# Patient Record
Sex: MALE | Race: WHITE | NOT HISPANIC OR LATINO
[De-identification: names, ages, dates, MRNs, and addresses within clinical notes are randomized per-mention and may not be internally consistent; named-entity substitution may affect disease eponyms.]

---

## 2021-06-23 ENCOUNTER — NON-APPOINTMENT (OUTPATIENT)
Age: 51
End: 2021-06-23

## 2021-06-23 ENCOUNTER — APPOINTMENT (OUTPATIENT)
Dept: CARDIOLOGY | Facility: CLINIC | Age: 51
End: 2021-06-23
Payer: COMMERCIAL

## 2021-06-23 VITALS
SYSTOLIC BLOOD PRESSURE: 139 MMHG | WEIGHT: 250 LBS | BODY MASS INDEX: 29.52 KG/M2 | RESPIRATION RATE: 18 BRPM | DIASTOLIC BLOOD PRESSURE: 98 MMHG | HEIGHT: 77 IN | TEMPERATURE: 98.4 F | HEART RATE: 77 BPM

## 2021-06-23 DIAGNOSIS — Z00.00 ENCOUNTER FOR GENERAL ADULT MEDICAL EXAMINATION W/OUT ABNORMAL FINDINGS: ICD-10-CM

## 2021-06-23 DIAGNOSIS — F17.200 NICOTINE DEPENDENCE, UNSPECIFIED, UNCOMPLICATED: ICD-10-CM

## 2021-06-23 DIAGNOSIS — Z78.9 OTHER SPECIFIED HEALTH STATUS: ICD-10-CM

## 2021-06-23 PROCEDURE — 93000 ELECTROCARDIOGRAM COMPLETE: CPT

## 2021-06-23 PROCEDURE — 99072 ADDL SUPL MATRL&STAF TM PHE: CPT

## 2021-06-23 PROCEDURE — 99244 OFF/OP CNSLTJ NEW/EST MOD 40: CPT

## 2021-06-23 RX ORDER — DESLORATADINE 5 MG/1
5 TABLET ORAL
Refills: 0 | Status: ACTIVE | COMMUNITY

## 2021-08-09 ENCOUNTER — APPOINTMENT (OUTPATIENT)
Dept: CARDIOLOGY | Facility: CLINIC | Age: 51
End: 2021-08-09
Payer: COMMERCIAL

## 2021-08-09 PROCEDURE — 93015 CV STRESS TEST SUPVJ I&R: CPT

## 2021-08-09 PROCEDURE — 99072 ADDL SUPL MATRL&STAF TM PHE: CPT

## 2021-08-09 PROCEDURE — 93306 TTE W/DOPPLER COMPLETE: CPT

## 2021-09-09 ENCOUNTER — APPOINTMENT (OUTPATIENT)
Dept: CARDIOLOGY | Facility: CLINIC | Age: 51
End: 2021-09-09
Payer: COMMERCIAL

## 2021-09-09 ENCOUNTER — NON-APPOINTMENT (OUTPATIENT)
Age: 51
End: 2021-09-09

## 2021-09-09 VITALS
BODY MASS INDEX: 29.52 KG/M2 | HEART RATE: 76 BPM | SYSTOLIC BLOOD PRESSURE: 150 MMHG | HEIGHT: 77 IN | RESPIRATION RATE: 18 BRPM | WEIGHT: 250 LBS | DIASTOLIC BLOOD PRESSURE: 90 MMHG

## 2021-09-09 DIAGNOSIS — Z82.49 FAMILY HISTORY OF ISCHEMIC HEART DISEASE AND OTHER DISEASES OF THE CIRCULATORY SYSTEM: ICD-10-CM

## 2021-09-09 PROCEDURE — 99072 ADDL SUPL MATRL&STAF TM PHE: CPT

## 2021-09-09 PROCEDURE — 93000 ELECTROCARDIOGRAM COMPLETE: CPT

## 2021-09-09 PROCEDURE — 99214 OFFICE O/P EST MOD 30 MIN: CPT

## 2021-09-09 NOTE — ASSESSMENT
[FreeTextEntry1] : EKG shows normal sinus rhythm at a rate of 74 with poor R-wave progression V1 to V3 and nonspecific T-wave flattening;\par \par In summary this 50-year-old black male has a history of recent uncontrolled hypertension but stable cardiac pattern and stable cardiac testing with negative stress test\par \par Plan:\par \par Patient recommended to increase lisinopril to 40 mg p.o. daily;\par \par Continue home blood pressure monitoring on periodic basis\par \par Continue low sodium, low carbohydrate reducing diet\par \par Return to office within 3 months or p.r.n.\par \par Followup with primary care for timely checkups and laboratory blood tests;;;;

## 2021-09-09 NOTE — HISTORY OF PRESENT ILLNESS
[FreeTextEntry1] : Graded cardiovascular exercise stress test from 8/9/21 showed below average exercise tolerance into stage III (shortness of breath from mask patient reports)-no symptoms of chest pain, no arrhythmias seen occasional PVCs exaggerated blood pressure response peaking at 220/88 mmHg; 89% of predicted max heart rate of 150;\par EKG remained negative for ST abnormality or ischemia;\par \par Transthoracic echo from August 9, 2021 shows preserved cardiac chamber sizes with normal systolic function of the left ventricle and normal ejection fraction in the range of 55%. There was no significant valvulopathy seen trace / or  trivial pericardial effusion;\par \par

## 2021-09-09 NOTE — REASON FOR VISIT
[Arrhythmia/ECG Abnorrmalities] : arrhythmia/ECG abnormalities [Hypertension] : hypertension [Other: ____] : [unfilled] [FreeTextEntry3] : H R / K H [FreeTextEntry1] : The patient is a 50-year-old  male with history for uncontrolled hypertension and abnormal EKG with some atypical chest pain syndrome;\par \par He presents back to the office today to discuss results of recent cardiac testing and to reassess his blood pressure;\par \par Overall, patient states that he has been feeling fairly well and sticking to a diet and taking FiberCon which is also told to lose weight and become less bloated;\par \par He denies any recent chest pain, shortness of breath, palpitations or dizziness;

## 2021-09-09 NOTE — PHYSICAL EXAM
[Well Developed] : well developed [Well Nourished] : well nourished [No Acute Distress] : no acute distress [Normal Conjunctiva] : normal conjunctiva [Normal Venous Pressure] : normal venous pressure [No Carotid Bruit] : no carotid bruit [Normal S1, S2] : normal S1, S2 [No Rub] : no rub [No Murmur] : no murmur [No Gallop] : no gallop [Clear Lung Fields] : clear lung fields [Good Air Entry] : good air entry [No Respiratory Distress] : no respiratory distress  [Soft] : abdomen soft [Non Tender] : non-tender [No Masses/organomegaly] : no masses/organomegaly [Normal Bowel Sounds] : normal bowel sounds [Normal Gait] : normal gait [No Edema] : no edema [No Cyanosis] : no cyanosis [No Clubbing] : no clubbing [No Varicosities] : no varicosities [No Rash] : no rash [No Skin Lesions] : no skin lesions [Moves all extremities] : moves all extremities [No Focal Deficits] : no focal deficits [Normal Speech] : normal speech [Alert and Oriented] : alert and oriented [Normal memory] : normal memory [de-identified] : tall  [de-identified] : obese

## 2021-12-27 ENCOUNTER — NON-APPOINTMENT (OUTPATIENT)
Age: 51
End: 2021-12-27

## 2021-12-27 ENCOUNTER — APPOINTMENT (OUTPATIENT)
Dept: CARDIOLOGY | Facility: CLINIC | Age: 51
End: 2021-12-27
Payer: COMMERCIAL

## 2021-12-27 VITALS
RESPIRATION RATE: 16 BRPM | DIASTOLIC BLOOD PRESSURE: 98 MMHG | BODY MASS INDEX: 29.52 KG/M2 | WEIGHT: 250 LBS | HEART RATE: 64 BPM | HEIGHT: 77 IN | SYSTOLIC BLOOD PRESSURE: 123 MMHG

## 2021-12-27 PROCEDURE — 99214 OFFICE O/P EST MOD 30 MIN: CPT

## 2021-12-27 PROCEDURE — 93000 ELECTROCARDIOGRAM COMPLETE: CPT

## 2021-12-27 PROCEDURE — 99072 ADDL SUPL MATRL&STAF TM PHE: CPT

## 2021-12-27 RX ORDER — AZELASTINE HYDROCHLORIDE 137 UG/1
0.1 SPRAY, METERED NASAL
Refills: 0 | Status: DISCONTINUED | COMMUNITY
End: 2021-12-27

## 2021-12-27 NOTE — HISTORY OF PRESENT ILLNESS
[FreeTextEntry1] : He reports that the increasing the lisinopril to 40 mg daily he actually did feel better and had less of a headache;\par \par Today, blood pressure appears elevated at 123/98;\par \par Regular cardiac stress test from August 2021 showed good exercise tolerance. No arrhythmias seen. EKG remained negative for any significant ST abnormalities or ischemia;\par \par

## 2021-12-27 NOTE — ASSESSMENT
[FreeTextEntry1] : EKG demonstrated normal sinus rhythm at a rate of 64 with some nonspecific T wave flattening noted of questionable significance-unchanged;\par \par In summary, this 51-year-old  male with history for chronic hypertension and abnormal EKG, ; demonstrates diastolic hypertension again in the office today despite recent increase in his medication\par \par Plan:\par \par Counseled patient again on the fourth of pursuing a low sodium, low carbohydrate reducing diet;\par \par Patient recommended empirically to add amlodipine 5 mg p.o. daily to her current medical regimen;\par \par continue periodic home blood pressure monitoring;\par \par Followup to our office within 2-3 months for blood pressure check and assessment;\par \par

## 2021-12-27 NOTE — PHYSICAL EXAM

## 2021-12-27 NOTE — REASON FOR VISIT
[Arrhythmia/ECG Abnorrmalities] : arrhythmia/ECG abnormalities [Hypertension] : hypertension [FreeTextEntry3] : JONATHAN Cole [FreeTextEntry1] : The patient is a 51-year-old white male with a history for chronic hypertension and abnormal EKG who presents back to the office today for general cardiac checkup;\par \par The patient reports that he has been asymptomatic for chest pain, shortness of breath, palpitations or dizziness;\par \par However, he states his blood pressure has been less than ideally controlled when he has it checked himself;

## 2022-02-07 ENCOUNTER — NON-APPOINTMENT (OUTPATIENT)
Age: 52
End: 2022-02-07

## 2022-02-07 ENCOUNTER — APPOINTMENT (OUTPATIENT)
Dept: CARDIOLOGY | Facility: CLINIC | Age: 52
End: 2022-02-07
Payer: COMMERCIAL

## 2022-02-07 VITALS
DIASTOLIC BLOOD PRESSURE: 80 MMHG | RESPIRATION RATE: 16 BRPM | BODY MASS INDEX: 29.52 KG/M2 | WEIGHT: 250 LBS | SYSTOLIC BLOOD PRESSURE: 120 MMHG | HEIGHT: 77 IN | HEART RATE: 66 BPM

## 2022-02-07 PROCEDURE — 93000 ELECTROCARDIOGRAM COMPLETE: CPT

## 2022-02-07 PROCEDURE — 99072 ADDL SUPL MATRL&STAF TM PHE: CPT

## 2022-02-07 PROCEDURE — 99213 OFFICE O/P EST LOW 20 MIN: CPT

## 2022-02-16 NOTE — ASSESSMENT
[FreeTextEntry1] : EKG demonstrated normal sinus rhythm at a rate of 66 with some nonspecific T wave flattening noted of questionable significance-unchanged;\par \par In summary, this 51-year-old male with history for chronic hypertension and abnormal EKG, ; had demonstrated diastolic hypertension in the office last December, was added Amlodipine 5 mg QD with today's blood pressure well controlled;

## 2022-02-16 NOTE — PHYSICAL EXAM
[Well Developed] : well developed [No Acute Distress] : no acute distress [Normal Conjunctiva] : normal conjunctiva [Normal Venous Pressure] : normal venous pressure [No Carotid Bruit] : no carotid bruit [Normal S1, S2] : normal S1, S2 [No Murmur] : no murmur [No Rub] : no rub [No Gallop] : no gallop [Clear Lung Fields] : clear lung fields [Good Air Entry] : good air entry [No Respiratory Distress] : no respiratory distress  [Soft] : abdomen soft [Non Tender] : non-tender [No Masses/organomegaly] : no masses/organomegaly [Normal Gait] : normal gait [No Edema] : no edema [No Cyanosis] : no cyanosis [No Clubbing] : no clubbing [No Rash] : no rash [No Skin Lesions] : no skin lesions [Moves all extremities] : moves all extremities [No Focal Deficits] : no focal deficits [Normal Speech] : normal speech [Alert and Oriented] : alert and oriented [Normal memory] : normal memory [de-identified] : overweight

## 2022-02-16 NOTE — DISCUSSION/SUMMARY
[FreeTextEntry1] : 1).  Patient's blood pressure now well controlled on current regimen.  Continue with Lisinopril 40 mg QD and Amlodipine 5 mg QD.\par \par He was instructed to monitor BP at home 2-3 x week, approximately 2-3 hours after taking A.M. meds and bring log to f/u.  Will purchase new BP cuff (OMRON series 3).\par \par 2).  Diet and lifestyle modification discussed including low sodium, low fat and low carbohydrate weight reducing diet.  Patient is to implement aerobic exercise regimen few days per week. \par \par 3).  Follow up with PCP regarding routine checkups and blood work.  Forward all testing/lab work to our office.\par \par 4).  Recommend patient report any untoward symptoms. \par \par 5).  No additional cardiac testing indicated at this time. \par \par 6).  Follow up with Dr. Lopez in 5-6 months or PRN.

## 2022-02-16 NOTE — REASON FOR VISIT
[FreeTextEntry1] : NATHALIE ROUSE is being seen for arrhythmia/ECG abnormalities and hypertension. \par \par The patient is a 51-year-old white male with a history for chronic hypertension and abnormal EKG who presents back to the office today for general cardiac checkup;\par \par The patient reports that he has been asymptomatic for chest pain, shortness of breath, palpitations or dizziness;\par \par His blood pressure had been less than ideally controlled and we had started him on Amlodipine 5 mg in addition to his usual regimen of Lisinopril 40 mg QD;

## 2022-02-16 NOTE — HISTORY OF PRESENT ILLNESS
[FreeTextEntry1] : Blood pressure today well controlled at 120/80;\par \par Regular cardiac stress test from August 2021 showed good exercise tolerance. No arrhythmias seen. EKG remained negative for any significant ST abnormalities or ischemia;

## 2022-08-12 ENCOUNTER — APPOINTMENT (OUTPATIENT)
Dept: CARDIOLOGY | Facility: CLINIC | Age: 52
End: 2022-08-12

## 2022-08-12 VITALS
WEIGHT: 257 LBS | SYSTOLIC BLOOD PRESSURE: 114 MMHG | RESPIRATION RATE: 16 BRPM | DIASTOLIC BLOOD PRESSURE: 76 MMHG | HEIGHT: 77 IN | BODY MASS INDEX: 30.34 KG/M2

## 2022-08-12 PROCEDURE — 93000 ELECTROCARDIOGRAM COMPLETE: CPT

## 2022-08-12 PROCEDURE — 99024 POSTOP FOLLOW-UP VISIT: CPT

## 2022-08-12 NOTE — ASSESSMENT
[FreeTextEntry1] : EKG demonstrated normal sinus rhythm at a rate of 71; with PRWP V1-3; no acute changes;\par \par \par In summary, this 51-year-old male with history for chronic hypertension and abnormal EKG, ; had demonstrated diastolic hypertension in the office last December, was added Amlodipine 5 mg QD with blood pressure still appears well controlled; otherwise, stable cardiac pattern;\par \par

## 2022-08-12 NOTE — PHYSICAL EXAM

## 2022-08-12 NOTE — DISCUSSION/SUMMARY
[FreeTextEntry1] : 1).  Patient's blood pressure now well controlled on current regimen.  Continue with Lisinopril 40 mg QD and Amlodipine 5 mg QD.\par \par \par \par 2).  Diet and lifestyle modification discussed including low sodium, low fat and low carbohydrate weight reducing diet.  Patient is to implement aerobic exercise regimen few days per week. \par \par 3).  Follow up with PCP regarding routine checkups and blood work.  Regular checkups and laboratory blood tests recommended;\par \par 4).  Recommend patient report any untoward symptoms. \par \par 5).  No additional cardiac testing indicated at this time.  Follow-up within 5 months or as needed;\par \par 6).  Follow up with Dr. Lopez in 5-6 months or PRN.

## 2022-08-12 NOTE — REASON FOR VISIT
[FreeTextEntry1] : NATHALIE ROUSE is being seen for arrhythmia/ECG abnormalities and hypertension. \par \par The patient is a 51-year-old white male working as a , with a history for chronic hypertension and abnormal EKG who presents back to the office today for general cardiac checkup;\par \par The patient reports that he has been asymptomatic for chest pain, shortness of breath, palpitations or dizziness;\par Since adjustment of his blood pressure medication he has been under much better control with his blood pressure lately;

## 2023-01-17 ENCOUNTER — NON-APPOINTMENT (OUTPATIENT)
Age: 53
End: 2023-01-17

## 2023-01-17 ENCOUNTER — APPOINTMENT (OUTPATIENT)
Dept: CARDIOLOGY | Facility: CLINIC | Age: 53
End: 2023-01-17
Payer: COMMERCIAL

## 2023-01-17 VITALS
HEIGHT: 77 IN | SYSTOLIC BLOOD PRESSURE: 110 MMHG | HEART RATE: 69 BPM | WEIGHT: 246 LBS | BODY MASS INDEX: 29.05 KG/M2 | RESPIRATION RATE: 16 BRPM | DIASTOLIC BLOOD PRESSURE: 70 MMHG

## 2023-01-17 PROCEDURE — 99072 ADDL SUPL MATRL&STAF TM PHE: CPT

## 2023-01-17 PROCEDURE — 99214 OFFICE O/P EST MOD 30 MIN: CPT

## 2023-01-17 PROCEDURE — 93000 ELECTROCARDIOGRAM COMPLETE: CPT

## 2023-01-17 NOTE — DISCUSSION/SUMMARY
[FreeTextEntry1] : 1).  Patient's blood pressure now well controlled on current regimen.  Continue with Lisinopril 40 mg QD and Amlodipine 5 mg QD.\par \par \par 2).  Encouraged to continue diet and lifestyle modification discussed including low sodium, low fat and low carbohydrate weight reducing diet.  Patient is to implement aerobic exercise regimen few days per week. \par \par 3).  Follow up with PCP regarding routine checkups and blood work.  Regular checkups and laboratory blood tests recommended;\par \par 4).  Recommend patient report any untoward symptoms. \par \par 5).  No additional cardiac testing indicated at this time.  Follow-up within 5 months or as needed;\par \par 6).  Follow up within 5 to 6 months or as needed;

## 2023-01-17 NOTE — PHYSICAL EXAM

## 2023-01-17 NOTE — REASON FOR VISIT
[FreeTextEntry3] : JONATHAN Cole [FreeTextEntry1] : NATHALIE ROUSE is being seen for arrhythmia/ECG abnormalities and hypertension. \par \par The patient is a 52-year-old white male working as a , with a history for chronic hypertension and abnormal EKG who presents back to the office today for general cardiac checkup;\par \par The patient reports that he has been asymptomatic for chest pain, shortness of breath, palpitations or dizziness;\par Since adjustment of his blood pressure medication he has been under much better control with his blood pressure lately;

## 2023-01-17 NOTE — HISTORY OF PRESENT ILLNESS
[FreeTextEntry1] : Blood pressure today well controlled at 110/70;\par \par Regular cardiac stress test from August 2021 showed good exercise tolerance. No arrhythmias seen. EKG remained negative for any significant ST abnormalities or ischemia;

## 2023-01-17 NOTE — ASSESSMENT
[FreeTextEntry1] : EKG demonstrated normal sinus rhythm at a rate of 69; with PRWP V1-3; no acute changes;\par \par \par In summary, this 52-year-old male with history for chronic hypertension and abnormal EKG, ; had demonstrated diastolic hypertension in the office last December,   added Amlodipine 5 mg QD with blood pressure still appears well controlled; otherwise, stable cardiac pattern;\par Otherwise, stable cardiac pattern;\par

## 2023-07-06 ENCOUNTER — OFFICE (OUTPATIENT)
Dept: URBAN - METROPOLITAN AREA CLINIC 104 | Facility: CLINIC | Age: 53
Setting detail: OPHTHALMOLOGY
End: 2023-07-06
Payer: COMMERCIAL

## 2023-07-06 DIAGNOSIS — B00.52: ICD-10-CM

## 2023-07-06 PROCEDURE — 92002 INTRM OPH EXAM NEW PATIENT: CPT | Performed by: OPTOMETRIST

## 2023-07-06 ASSESSMENT — TONOMETRY
OS_IOP_MMHG: 17
OD_IOP_MMHG: 17

## 2023-07-06 ASSESSMENT — REFRACTION_AUTOREFRACTION
OS_SPHERE: +1.25
OS_CYLINDER: -0.25
OD_CYLINDER: -0.50
OD_AXIS: 145
OD_SPHERE: +1.00
OS_AXIS: 078

## 2023-07-06 ASSESSMENT — REFRACTION_CURRENTRX
OD_CYLINDER: -0.25
OS_ADD: +1.75
OD_OVR_VA: 20/
OD_VPRISM_DIRECTION: PROGS
OD_AXIS: 149
OS_SPHERE: +1.25
OD_ADD: +1.75
OD_SPHERE: +1.25
OS_CYLINDER: -0.25
OS_OVR_VA: 20/
OS_AXIS: 029
OS_VPRISM_DIRECTION: PROGS

## 2023-07-06 ASSESSMENT — VISUAL ACUITY
OD_BCVA: 20/20
OS_BCVA: 20/20

## 2023-07-06 ASSESSMENT — KERATOMETRY
OS_K1POWER_DIOPTERS: 43.72
OD_AXISANGLE_DEGREES: 088
OS_K2POWER_DIOPTERS: 44.47
OS_AXISANGLE_DEGREES: 090
OD_K1POWER_DIOPTERS: 43.72
OD_K2POWER_DIOPTERS: 44.23

## 2023-07-06 ASSESSMENT — SPHEQUIV_DERIVED
OD_SPHEQUIV: 0.75
OS_SPHEQUIV: 1.125

## 2023-07-06 ASSESSMENT — CONFRONTATIONAL VISUAL FIELD TEST (CVF)
OS_FINDINGS: FULL
OD_FINDINGS: FULL

## 2023-07-06 ASSESSMENT — AXIALLENGTH_DERIVED
OS_AL: 22.9528
OD_AL: 23.1343

## 2023-07-13 ENCOUNTER — OFFICE (OUTPATIENT)
Dept: URBAN - METROPOLITAN AREA CLINIC 104 | Facility: CLINIC | Age: 53
Setting detail: OPHTHALMOLOGY
End: 2023-07-13
Payer: COMMERCIAL

## 2023-07-13 DIAGNOSIS — H16.222: ICD-10-CM

## 2023-07-13 PROBLEM — B00.52 HERPES SIMPLEX DENDRITIC KERATITIS: Status: RESOLVED | Noted: 2023-07-06 | Resolved: 2023-07-13

## 2023-07-13 PROCEDURE — 99213 OFFICE O/P EST LOW 20 MIN: CPT | Performed by: OPTOMETRIST

## 2023-07-13 ASSESSMENT — SUPERFICIAL PUNCTATE KERATITIS (SPK): OS_SPK: T

## 2023-07-13 ASSESSMENT — REFRACTION_CURRENTRX
OS_SPHERE: +1.25
OS_VPRISM_DIRECTION: PROGS
OD_AXIS: 149
OD_VPRISM_DIRECTION: PROGS
OD_CYLINDER: -0.25
OD_SPHERE: +1.25
OS_OVR_VA: 20/
OS_CYLINDER: -0.25
OS_AXIS: 029
OD_ADD: +1.75
OD_OVR_VA: 20/
OS_ADD: +1.75

## 2023-07-13 ASSESSMENT — REFRACTION_AUTOREFRACTION
OD_AXIS: 145
OS_CYLINDER: -0.25
OS_SPHERE: +1.25
OD_SPHERE: +1.00
OD_CYLINDER: -0.50
OS_AXIS: 078

## 2023-07-13 ASSESSMENT — KERATOMETRY
OS_AXISANGLE_DEGREES: 090
OS_K2POWER_DIOPTERS: 44.47
OD_AXISANGLE_DEGREES: 088
OS_K1POWER_DIOPTERS: 43.72
OD_K1POWER_DIOPTERS: 43.72
OD_K2POWER_DIOPTERS: 44.23

## 2023-07-13 ASSESSMENT — SPHEQUIV_DERIVED
OD_SPHEQUIV: 0.75
OS_SPHEQUIV: 1.125

## 2023-07-13 ASSESSMENT — AXIALLENGTH_DERIVED
OD_AL: 23.1343
OS_AL: 22.9528

## 2023-07-13 ASSESSMENT — VISUAL ACUITY
OS_BCVA: 20/20
OD_BCVA: 20/20

## 2023-07-17 ENCOUNTER — APPOINTMENT (OUTPATIENT)
Dept: CARDIOLOGY | Facility: CLINIC | Age: 53
End: 2023-07-17
Payer: COMMERCIAL

## 2023-07-17 VITALS
WEIGHT: 245 LBS | SYSTOLIC BLOOD PRESSURE: 110 MMHG | DIASTOLIC BLOOD PRESSURE: 68 MMHG | HEIGHT: 77 IN | HEART RATE: 59 BPM | RESPIRATION RATE: 16 BRPM | BODY MASS INDEX: 28.93 KG/M2

## 2023-07-17 PROCEDURE — 99214 OFFICE O/P EST MOD 30 MIN: CPT

## 2023-07-17 PROCEDURE — 93000 ELECTROCARDIOGRAM COMPLETE: CPT

## 2023-07-17 RX ORDER — LISINOPRIL 40 MG/1
40 TABLET ORAL DAILY
Qty: 90 | Refills: 3 | Status: ACTIVE | COMMUNITY
Start: 1900-01-01 | End: 1900-01-01

## 2023-07-17 RX ORDER — AMLODIPINE BESYLATE 5 MG/1
5 TABLET ORAL DAILY
Qty: 90 | Refills: 3 | Status: ACTIVE | COMMUNITY
Start: 2021-12-27 | End: 1900-01-01

## 2023-07-17 NOTE — DISCUSSION/SUMMARY
[FreeTextEntry1] : 1).  Patient's blood pressure now well controlled on current regimen.  Continue with Lisinopril 40 mg QD and Amlodipine 5 mg QD.\par \par \par 2).  Encouraged to continue diet and lifestyle modification discussed including low sodium, low fat and low carbohydrate weight reducing diet.  Patient is to implement aerobic exercise regimen few days per week. \par \par 3).  Follow up with PCP regarding routine checkups and blood work.  Regular checkups and laboratory blood tests recommended;\par \par 4).  Recommend patient report any untoward symptoms. \par \par 5).  We will schedule transthoracic echocardiogram to assess cardiac function and valvulopathy prior to next visit within 5 to 6 months;

## 2023-07-17 NOTE — ASSESSMENT
[FreeTextEntry1] : EKG demonstrated normal sinus rhythm at a rate of 59; with; no acute changes;\par \par \par In summary, this 52-year-old male with history for chronic hypertension and abnormal EKG, ; had demonstrated diastolic hypertension in the office last December,   added Amlodipine 5 mg QD with blood pressure still appears well controlled; otherwise, stable cardiac pattern;\par \par \par Plan:\par

## 2023-07-17 NOTE — PHYSICAL EXAM

## 2023-07-17 NOTE — HISTORY OF PRESENT ILLNESS
[FreeTextEntry1] : Blood pressure today well controlled at 110/68;\par \par Regular cardiac stress test from August 2021 showed good exercise tolerance. No arrhythmias seen. EKG remained negative for any significant ST abnormalities or ischemia;

## 2023-11-02 ENCOUNTER — RX ONLY (RX ONLY)
Age: 53
End: 2023-11-02

## 2023-11-02 ENCOUNTER — OFFICE (OUTPATIENT)
Dept: URBAN - METROPOLITAN AREA CLINIC 104 | Facility: CLINIC | Age: 53
Setting detail: OPHTHALMOLOGY
End: 2023-11-02
Payer: COMMERCIAL

## 2023-11-02 DIAGNOSIS — B00.52: ICD-10-CM

## 2023-11-02 PROCEDURE — 99213 OFFICE O/P EST LOW 20 MIN: CPT | Performed by: OPTOMETRIST

## 2023-11-02 ASSESSMENT — REFRACTION_AUTOREFRACTION
OS_SPHERE: UNABLE
OD_SPHERE: +0.75
OD_AXIS: 136
OD_CYLINDER: -1.25

## 2023-11-02 ASSESSMENT — REFRACTION_CURRENTRX
OS_ADD: +2.25
OS_SPHERE: +1.25
OD_OVR_VA: 20/
OS_CYLINDER: -0.25
OD_CYLINDER: -0.25
OD_ADD: +2.25
OD_AXIS: 152
OS_AXIS: 030
OS_OVR_VA: 20/
OD_SPHERE: +1.25

## 2023-11-02 ASSESSMENT — CONFRONTATIONAL VISUAL FIELD TEST (CVF)
OD_FINDINGS: FULL
OS_FINDINGS: FULL

## 2023-11-02 ASSESSMENT — SPHEQUIV_DERIVED: OD_SPHEQUIV: 0.125

## 2023-11-09 ENCOUNTER — OFFICE (OUTPATIENT)
Dept: URBAN - METROPOLITAN AREA CLINIC 104 | Facility: CLINIC | Age: 53
Setting detail: OPHTHALMOLOGY
End: 2023-11-09
Payer: COMMERCIAL

## 2023-11-09 ENCOUNTER — RX ONLY (RX ONLY)
Age: 53
End: 2023-11-09

## 2023-11-09 DIAGNOSIS — B00.52: ICD-10-CM

## 2023-11-09 PROCEDURE — 99213 OFFICE O/P EST LOW 20 MIN: CPT | Performed by: OPTOMETRIST

## 2023-11-09 ASSESSMENT — CONFRONTATIONAL VISUAL FIELD TEST (CVF)
OS_FINDINGS: FULL
OD_FINDINGS: FULL

## 2023-11-13 ENCOUNTER — OFFICE (OUTPATIENT)
Dept: URBAN - METROPOLITAN AREA CLINIC 104 | Facility: CLINIC | Age: 53
Setting detail: OPHTHALMOLOGY
End: 2023-11-13
Payer: COMMERCIAL

## 2023-11-13 DIAGNOSIS — B00.52: ICD-10-CM

## 2023-11-13 PROCEDURE — 99213 OFFICE O/P EST LOW 20 MIN: CPT | Performed by: OPTOMETRIST

## 2023-11-13 ASSESSMENT — CONFRONTATIONAL VISUAL FIELD TEST (CVF)
OS_FINDINGS: FULL
OD_FINDINGS: FULL

## 2023-11-22 ENCOUNTER — OFFICE (OUTPATIENT)
Dept: URBAN - METROPOLITAN AREA CLINIC 104 | Facility: CLINIC | Age: 53
Setting detail: OPHTHALMOLOGY
End: 2023-11-22
Payer: COMMERCIAL

## 2023-11-22 ENCOUNTER — RX ONLY (RX ONLY)
Age: 53
End: 2023-11-22

## 2023-11-22 DIAGNOSIS — B00.52: ICD-10-CM

## 2023-11-22 PROCEDURE — 99213 OFFICE O/P EST LOW 20 MIN: CPT | Performed by: OPTOMETRIST

## 2023-11-22 ASSESSMENT — CONFRONTATIONAL VISUAL FIELD TEST (CVF)
OS_FINDINGS: FULL
OD_FINDINGS: FULL

## 2023-11-27 ENCOUNTER — APPOINTMENT (OUTPATIENT)
Dept: CARDIOLOGY | Facility: CLINIC | Age: 53
End: 2023-11-27
Payer: COMMERCIAL

## 2023-11-27 PROCEDURE — 93306 TTE W/DOPPLER COMPLETE: CPT

## 2023-12-19 ENCOUNTER — APPOINTMENT (OUTPATIENT)
Dept: CARDIOLOGY | Facility: CLINIC | Age: 53
End: 2023-12-19
Payer: COMMERCIAL

## 2023-12-19 ENCOUNTER — NON-APPOINTMENT (OUTPATIENT)
Age: 53
End: 2023-12-19

## 2023-12-19 VITALS
HEART RATE: 58 BPM | DIASTOLIC BLOOD PRESSURE: 78 MMHG | RESPIRATION RATE: 16 BRPM | WEIGHT: 250 LBS | SYSTOLIC BLOOD PRESSURE: 125 MMHG | BODY MASS INDEX: 29.52 KG/M2 | OXYGEN SATURATION: 99 % | HEIGHT: 77 IN

## 2023-12-19 PROCEDURE — 99214 OFFICE O/P EST MOD 30 MIN: CPT

## 2023-12-19 PROCEDURE — 93000 ELECTROCARDIOGRAM COMPLETE: CPT

## 2023-12-19 NOTE — REASON FOR VISIT
[FreeTextEntry3] : JONATHAN Cole [FreeTextEntry1] : NATHALIE ROUSE is being seen for arrhythmia/ECG abnormalities and hypertension.   The patient is a 53-year-old white male working as a , with a history for chronic hypertension and abnormal EKG, with borderline enlargement of the ascending aorta previously reported on echo who presents back to the office today for general cardiac checkup, and to discuss results of most recent echocardiogram;  The patient reports that he has been asymptomatic for chest pain, shortness of breath, palpitations or dizziness; Since adjustment of his blood pressure medication he has been under much better control with his blood pressure lately;

## 2023-12-19 NOTE — ASSESSMENT
[FreeTextEntry1] : EKG demonstrating sinus bradycardia at a rate of 58; late R wave transition V2 to V3 and some general low voltage bipolar leads.  Otherwise no acute changes;  In summary, this 53-year-old gentleman has a history for hypertension which appears well-controlled on current medical regimen. Recent transthoracic echocardiogram shows preserved left ventricular systolic function with borderline LVH.  There was upper range of normal ascending aortic dimensions at 3.6 but actually "normal" for body mass etc.; Patient otherwise appears hemodynamically stable;  Plan:  Patient reassured;  No additional cardiac workup indicated at this time;  Continue current medical regimen;  Follow-up to our office within 6 months or as needed;  Continue regular checkups and laboratory blood test with primary care encouraged;

## 2023-12-19 NOTE — HISTORY OF PRESENT ILLNESS
[FreeTextEntry1] : Blood pressure today well controlled;  Transthoracic echocardiogram from November 27, 2023 demonstrates preserved left ventricular systolic function in the range of 55%.  There was some mild LVH noted.  Mild fibrocalcific sclerosis of the aortic valve without stenosis.  Trace tricuspid insufficiency; ascending aorta again measures at 3.6 cm -however with new guidelines and proportions, appears "normal" for patient's size and body mass;   Regular cardiac stress test from August 2021 showed good exercise tolerance. No arrhythmias seen. EKG remained negative for any significant ST abnormalities or ischemia;

## 2023-12-19 NOTE — PHYSICAL EXAM

## 2024-01-17 ENCOUNTER — OFFICE (OUTPATIENT)
Dept: URBAN - METROPOLITAN AREA CLINIC 104 | Facility: CLINIC | Age: 54
Setting detail: OPHTHALMOLOGY
End: 2024-01-17
Payer: COMMERCIAL

## 2024-01-17 DIAGNOSIS — H16.222: ICD-10-CM

## 2024-01-17 DIAGNOSIS — B00.52: ICD-10-CM

## 2024-01-17 DIAGNOSIS — H43.812: ICD-10-CM

## 2024-01-17 PROCEDURE — 92014 COMPRE OPH EXAM EST PT 1/>: CPT | Performed by: OPTOMETRIST

## 2024-01-17 ASSESSMENT — CONFRONTATIONAL VISUAL FIELD TEST (CVF)
OS_FINDINGS: FULL
OD_FINDINGS: FULL

## 2024-06-17 ENCOUNTER — NON-APPOINTMENT (OUTPATIENT)
Age: 54
End: 2024-06-17

## 2024-06-17 ENCOUNTER — APPOINTMENT (OUTPATIENT)
Dept: CARDIOLOGY | Facility: CLINIC | Age: 54
End: 2024-06-17
Payer: COMMERCIAL

## 2024-06-17 VITALS
HEART RATE: 80 BPM | HEIGHT: 77 IN | BODY MASS INDEX: 28.93 KG/M2 | RESPIRATION RATE: 16 BRPM | DIASTOLIC BLOOD PRESSURE: 78 MMHG | SYSTOLIC BLOOD PRESSURE: 108 MMHG | WEIGHT: 245 LBS

## 2024-06-17 DIAGNOSIS — Z86.79 PERSONAL HISTORY OF OTHER DISEASES OF THE CIRCULATORY SYSTEM: ICD-10-CM

## 2024-06-17 DIAGNOSIS — I10 ESSENTIAL (PRIMARY) HYPERTENSION: ICD-10-CM

## 2024-06-17 DIAGNOSIS — E78.00 PURE HYPERCHOLESTEROLEMIA, UNSPECIFIED: ICD-10-CM

## 2024-06-17 DIAGNOSIS — R94.31 ABNORMAL ELECTROCARDIOGRAM [ECG] [EKG]: ICD-10-CM

## 2024-06-17 PROCEDURE — G2211 COMPLEX E/M VISIT ADD ON: CPT

## 2024-06-17 PROCEDURE — 99214 OFFICE O/P EST MOD 30 MIN: CPT

## 2024-06-17 PROCEDURE — 93000 ELECTROCARDIOGRAM COMPLETE: CPT

## 2024-06-17 NOTE — PHYSICAL EXAM

## 2024-06-17 NOTE — REASON FOR VISIT
[FreeTextEntry3] : JONATHAN Cole [FreeTextEntry1] : NATHALIE ROUSE is being seen for arrhythmia/ECG abnormalities and hypertension.   The patient is a 53-year-old white male working as a , with a history for chronic hypertension and abnormal EKG, with borderline enlargement of the ascending aorta previously reported on echo who presents back to the office today for general cardiac checkup,   The patient reports that he has been asymptomatic for chest pain, shortness of breath, palpitations; however, over the past couple of days patient did have some episodes of a slight "dizziness" which seem to be more like an imbalance like feeling briefly and then improved;  He states he may have not been drinking enough fluids and may have been partially dehydrated.

## 2024-06-17 NOTE — ASSESSMENT
[FreeTextEntry1] : EKG demonstrating sinus bradycardia at a rate of 80; no acute changes;  In summary, this 53-year-old gentleman has a history for hypertension which appears well-controlled on current medical regimen. Recent transthoracic echocardiogram shows preserved left ventricular systolic function with borderline LVH.  There was upper range of normal ascending aortic dimensions at 3.6 but actually "normal" for body mass etc.; Patient otherwise appears hemodynamically stable;  Some slight transient dizziness described as possible imbalance but could be secondary to blood pressure medication and some dehydration and now seems to be better;  Plan:  Patient reassured;  No additional cardiac workup indicated at this time;  Continue current medical regimen; maintain adequate p.o. hydration especially in the warm summer months;  Follow-up to our office within 6 months or as needed;  Continue regular checkups and laboratory blood test with primary care encouraged;

## 2024-12-16 ENCOUNTER — APPOINTMENT (OUTPATIENT)
Dept: CARDIOLOGY | Facility: CLINIC | Age: 54
End: 2024-12-16

## 2025-01-22 ENCOUNTER — OFFICE (OUTPATIENT)
Dept: URBAN - METROPOLITAN AREA CLINIC 104 | Facility: CLINIC | Age: 55
Setting detail: OPHTHALMOLOGY
End: 2025-01-22
Payer: COMMERCIAL

## 2025-01-22 DIAGNOSIS — H43.812: ICD-10-CM

## 2025-01-22 DIAGNOSIS — H16.222: ICD-10-CM

## 2025-01-22 PROCEDURE — 92014 COMPRE OPH EXAM EST PT 1/>: CPT | Performed by: OPTOMETRIST

## 2025-01-22 ASSESSMENT — REFRACTION_AUTOREFRACTION
OS_AXIS: 075
OD_SPHERE: +1.25
OD_AXIS: 101
OS_CYLINDER: -0.75
OS_SPHERE: +1.75
OD_CYLINDER: -0.25

## 2025-01-22 ASSESSMENT — KERATOMETRY
OD_K2POWER_DIOPTERS: 43.50
OD_AXISANGLE_DEGREES: 090
OS_K1POWER_DIOPTERS: 43.50
OD_K1POWER_DIOPTERS: 43.50
OS_K2POWER_DIOPTERS: 43.75
OS_AXISANGLE_DEGREES: 176

## 2025-01-22 ASSESSMENT — TONOMETRY
OD_IOP_MMHG: 18
OS_IOP_MMHG: 18

## 2025-01-22 ASSESSMENT — CONFRONTATIONAL VISUAL FIELD TEST (CVF)
OS_FINDINGS: FULL
OD_FINDINGS: FULL

## 2025-01-22 ASSESSMENT — REFRACTION_CURRENTRX
OD_ADD: +1.75
OS_CYLINDER: -0.25
OD_SPHERE: +1.50
OD_AXIS: 142
OD_CYLINDER: -0.25
OS_OVR_VA: 20/
OS_AXIS: 037
OS_ADD: +1.75
OD_OVR_VA: 20/
OS_SPHERE: +1.50

## 2025-01-22 ASSESSMENT — VISUAL ACUITY
OS_BCVA: 20/20
OD_BCVA: 20/20

## 2025-02-05 ENCOUNTER — OFFICE (OUTPATIENT)
Dept: URBAN - METROPOLITAN AREA CLINIC 104 | Facility: CLINIC | Age: 55
Setting detail: OPHTHALMOLOGY
End: 2025-02-05
Payer: COMMERCIAL

## 2025-02-05 DIAGNOSIS — H16.222: ICD-10-CM

## 2025-02-05 PROCEDURE — 99213 OFFICE O/P EST LOW 20 MIN: CPT | Performed by: OPTOMETRIST

## 2025-02-05 ASSESSMENT — REFRACTION_CURRENTRX
OD_OVR_VA: 20/
OS_OVR_VA: 20/

## 2025-02-05 ASSESSMENT — TONOMETRY
OD_IOP_MMHG: 17
OS_IOP_MMHG: 18

## 2025-02-05 ASSESSMENT — CONFRONTATIONAL VISUAL FIELD TEST (CVF)
OS_FINDINGS: FULL
OD_FINDINGS: FULL

## 2025-02-05 ASSESSMENT — VISUAL ACUITY
OD_BCVA: 20/20
OS_BCVA: 20/20

## 2025-02-06 ENCOUNTER — APPOINTMENT (OUTPATIENT)
Dept: CARDIOLOGY | Facility: CLINIC | Age: 55
End: 2025-02-06
Payer: COMMERCIAL

## 2025-02-06 ENCOUNTER — NON-APPOINTMENT (OUTPATIENT)
Age: 55
End: 2025-02-06

## 2025-02-06 VITALS
RESPIRATION RATE: 16 BRPM | HEIGHT: 77 IN | SYSTOLIC BLOOD PRESSURE: 110 MMHG | WEIGHT: 247 LBS | BODY MASS INDEX: 29.16 KG/M2 | DIASTOLIC BLOOD PRESSURE: 64 MMHG | HEART RATE: 85 BPM

## 2025-02-06 DIAGNOSIS — I10 ESSENTIAL (PRIMARY) HYPERTENSION: ICD-10-CM

## 2025-02-06 DIAGNOSIS — R94.31 ABNORMAL ELECTROCARDIOGRAM [ECG] [EKG]: ICD-10-CM

## 2025-02-06 DIAGNOSIS — E78.00 PURE HYPERCHOLESTEROLEMIA, UNSPECIFIED: ICD-10-CM

## 2025-02-06 PROCEDURE — 93000 ELECTROCARDIOGRAM COMPLETE: CPT

## 2025-02-06 PROCEDURE — 99214 OFFICE O/P EST MOD 30 MIN: CPT

## 2025-02-13 ENCOUNTER — OFFICE (OUTPATIENT)
Dept: URBAN - METROPOLITAN AREA CLINIC 104 | Facility: CLINIC | Age: 55
Setting detail: OPHTHALMOLOGY
End: 2025-02-13
Payer: COMMERCIAL

## 2025-02-13 DIAGNOSIS — B00.52: ICD-10-CM

## 2025-02-13 PROCEDURE — 92012 INTRM OPH EXAM EST PATIENT: CPT | Performed by: OPTOMETRIST

## 2025-02-13 ASSESSMENT — CONFRONTATIONAL VISUAL FIELD TEST (CVF)
OS_FINDINGS: FULL
OD_FINDINGS: FULL

## 2025-02-13 ASSESSMENT — TONOMETRY: OS_IOP_MMHG: 18

## 2025-02-13 ASSESSMENT — VISUAL ACUITY
OD_BCVA: 20/20
OS_BCVA: 20/20

## 2025-04-14 ENCOUNTER — OFFICE (OUTPATIENT)
Dept: URBAN - METROPOLITAN AREA CLINIC 104 | Facility: CLINIC | Age: 55
Setting detail: OPHTHALMOLOGY
End: 2025-04-14
Payer: COMMERCIAL

## 2025-04-14 DIAGNOSIS — H16.222: ICD-10-CM

## 2025-04-14 PROCEDURE — 92012 INTRM OPH EXAM EST PATIENT: CPT | Performed by: OPTOMETRIST

## 2025-04-14 ASSESSMENT — CONFRONTATIONAL VISUAL FIELD TEST (CVF)
OS_FINDINGS: FULL
OD_FINDINGS: FULL

## 2025-04-14 ASSESSMENT — VISUAL ACUITY
OS_BCVA: 20/20
OD_BCVA: 20/20

## 2025-04-14 ASSESSMENT — TONOMETRY: OS_IOP_MMHG: 19

## 2025-04-18 ENCOUNTER — OFFICE (OUTPATIENT)
Dept: URBAN - METROPOLITAN AREA CLINIC 104 | Facility: CLINIC | Age: 55
Setting detail: OPHTHALMOLOGY
End: 2025-04-18
Payer: COMMERCIAL

## 2025-04-18 DIAGNOSIS — H16.222: ICD-10-CM

## 2025-04-18 PROCEDURE — 99213 OFFICE O/P EST LOW 20 MIN: CPT | Performed by: OPTOMETRIST

## 2025-04-18 ASSESSMENT — CONFRONTATIONAL VISUAL FIELD TEST (CVF)
OD_FINDINGS: FULL
OS_FINDINGS: FULL

## 2025-04-18 ASSESSMENT — VISUAL ACUITY
OD_BCVA: 20/20
OS_BCVA: 20/20

## 2025-06-30 ENCOUNTER — APPOINTMENT (OUTPATIENT)
Dept: CARDIOLOGY | Facility: CLINIC | Age: 55
End: 2025-06-30

## 2025-07-15 ENCOUNTER — APPOINTMENT (OUTPATIENT)
Dept: CARDIOLOGY | Facility: CLINIC | Age: 55
End: 2025-07-15

## 2025-07-16 ENCOUNTER — APPOINTMENT (OUTPATIENT)
Dept: CARDIOLOGY | Facility: CLINIC | Age: 55
End: 2025-07-16
Payer: COMMERCIAL

## 2025-07-16 PROCEDURE — 93306 TTE W/DOPPLER COMPLETE: CPT

## 2025-07-24 ENCOUNTER — APPOINTMENT (OUTPATIENT)
Dept: CARDIOLOGY | Facility: CLINIC | Age: 55
End: 2025-07-24

## 2025-07-24 VITALS
SYSTOLIC BLOOD PRESSURE: 124 MMHG | RESPIRATION RATE: 16 BRPM | DIASTOLIC BLOOD PRESSURE: 82 MMHG | BODY MASS INDEX: 30.34 KG/M2 | WEIGHT: 257 LBS | HEIGHT: 77 IN | HEART RATE: 78 BPM

## 2025-07-24 DIAGNOSIS — R94.31 ABNORMAL ELECTROCARDIOGRAM [ECG] [EKG]: ICD-10-CM

## 2025-07-24 DIAGNOSIS — R06.09 OTHER FORMS OF DYSPNEA: ICD-10-CM

## 2025-07-24 PROCEDURE — G2211 COMPLEX E/M VISIT ADD ON: CPT | Mod: NC

## 2025-07-24 PROCEDURE — 99214 OFFICE O/P EST MOD 30 MIN: CPT

## 2025-07-24 RX ORDER — VALACYCLOVIR HYDROCHLORIDE 1 G/1
1 TABLET, FILM COATED ORAL
Refills: 0 | Status: ACTIVE | COMMUNITY